# Patient Record
(demographics unavailable — no encounter records)

---

## 2025-01-10 NOTE — DISCUSSION/SUMMARY
[Reviewed Clinical Lab Test(s)] : Results of clinical tests were reviewed. [Reviewed Radiology Report(s)] : Radiology reports were reviewed. [Discuss Alternatives/Risks/Benefits w/Patient] : All alternatives, risks, and benefits were discussed with the patient/family and all questions were answered.  Patient expressed good understanding and appreciates the importance of follow up as recommended. [Visit Time ___ Minutes] : [unfilled] minutes [Face to Face Time___ Minutes] : with [unfilled] minutes in face to face consultation. [Reviewed Radiology Film/Image(s)] : Images from radiology studies were reviewed and examined. [FreeTextEntry1] : 66yo P3 followed for asxs simple right ovarian cyst. stable on recent sono. continue surveillance, no need for intervention. -more than 50% of visit spent face to face with patient counseling and coordinating care -I reviewed again benign vs malignant etiology of ovarian cysts. I explained that in her case cyst appears simple on imaging and has remained stable in size over serial imaging. I explained that a recent study reported that simple and stable ovarian cysts that are unchnaged on serial imaging in menopausal patients are most likely benign with very low percentage risk for malignancy. I explained that given her obesity and other comorbidities she is mod to high risk for surgical complications and without sxs and low suspicion for cancer here I do not recommend surgery. I recommended annual pelvic sono or as needed and pt can establish care with general GYN. Names of [facility] providers close to her home provided. all questions answered and patient expressed understanding -rtc PRN -pt to continue weight mgmt -pt to continue health maintenance with PCP -pain/fever/bleeding precautions given

## 2025-01-10 NOTE — HISTORY OF PRESENT ILLNESS
[FreeTextEntry1] :  66yo P3  being followed for incidental finding of right ovarian cyst. Pt here today for follow up and review of recent pelvic sono. Since last visit patient reports doing well. she  denies pain/fever/bleeding/bloating. She has normal activity tolerance and normal appetite. Reports normal urination and BMs. she is up to date with her PCP and with health maintenance. she is on weight loss journey and tried ozempic but caused too much nausea. she is hesitant to try monjaro as recommended by her PCP but has started weight watchers. We reviewed recent sono and cyst is stable. pt reassured. I also reviewed cervix and vulvar biopsies from last visit and all benign findings.   Labs: PAP: NILM (atrophic smear)/ HPV neg (24) Ca125: 3 (24)  24 PATHOLOGY: FINAL DIAGNOSIS A. CERVIX, 2:00, BIOPSY - Benign epidermolytic acanthoma, see comment B. LEFT LABIA/LEFT VULVA, BIOPSY - Benign squamous mucosa and stromal tissue focally lined by endocervical-type mucosa - Stains for p16 and Ki67 show normal patterns of expression in the mucosa, supporting this diagnosis - Deeper levels were examined DIAGNOSIS COMMENT Epidermolytic acanthomas are benign lesions that are not HPV-associated and are most commonly found on the skin of the genital areas.  IMAGIN25 Pelvic US: FINDINGS: Uterus: 6.3 cm x 3.2 cm x 3.8 cm. Within normal limits. Endometrium: 4 mm. Within normal limits. Right ovary: 5.6 cm x 4.6 cm x 4.7 cm simple right adnexal cyst. A separate right ovary is again not identified. Left ovary: Not seen. Fluid: None. IMPRESSION: Stable 5.6 cm simple right adnexal cyst.  24 MRI: Findings: Lower chest: Asymmetric elevation of the right hemidiaphragm Liver: Hepatomegaly and measures at least 20.5 cm diffuse hepatic steatosis.  Small area of fat sparing near the gallbladder fossa Bile ducts: Normal caliber Gallbladder: Dilated without wall thickening may be due to prolonged n.p.o. status Spleen: Within normal limits Pancreas: Few tiny scattered cyst under 3 mm.  No main duct dilation Adrenals: Left adrenal 4.1 x 3.6 x 3.2 cm enhancing masses primarily comprised of macroscopic fat with a small less than 25% macroscopic fat component Kidneys/ureters: Symmetric renal enhancement without hydronephrosis.  Right lower pole 1.1 cm simple cortical cyst corresponds with the CT finding.  Few additional subcentimeter bilateral simple cortical cysts Bladder: Minimally distended Reproductive organs: Atrophic uterus.  Atrophic endometrium measures 2 mm.  Atrophic left ovary. atrophic right ovary contains a unilocular simple appearing 5.4 x 4 x 4.3 cm cystic lesion without internal septations or nodularities Bowel: Unobstructed bowel.  Colonic diverticulosis.  Normal appendix. Peritoneum: No ascites Vessels: Arthrosclerotic changes. Retroperitoneum/lymph nodes: No lymphadenopathy.  Abdominal wall: Tiny fat-containing umbilical hernia.  Bones: Multilevel degenerative changes throughout the spine. Impression: 1.  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  Recommend correlation with prior CT or MRI if any remote examinations exist to determine stability of this mass.  Recommend urology referral for further management. 2.  Right ovarian 5.4 cm unilocular simple appearing cystic neoplasm unchanged when measuring in similar fashion.  Recommend follow-up pelvic ultrasound or MRI in 6 months in the absence of resection. 3.  Right lower renal pole finding corresponds with a small simple cortical cyst.  No suspicious renal abnormality. 4.  Hepatomegaly with diffuse hepatic steatosis.   24 pelvic sono: Findings: Uterus: 5.3 cm x 2.6 cm x 3.4 cm.  Within normal limits.  Endometrium: 5 mm.  Within normal limits. Right ovary: 5.0 cm right adnexal cyst.  Separate right ovary not identified.  Left ovary: Not seen.   Fluid: None Impression: 5.0 cm right adnexal simple cyst.  Separate right ovary not identified.  Yearly sonographic follow-up is advised.

## 2025-01-10 NOTE — DISCUSSION/SUMMARY
[Reviewed Clinical Lab Test(s)] : Results of clinical tests were reviewed. [Reviewed Radiology Report(s)] : Radiology reports were reviewed. [Discuss Alternatives/Risks/Benefits w/Patient] : All alternatives, risks, and benefits were discussed with the patient/family and all questions were answered.  Patient expressed good understanding and appreciates the importance of follow up as recommended. [Visit Time ___ Minutes] : [unfilled] minutes [Face to Face Time___ Minutes] : with [unfilled] minutes in face to face consultation. [Reviewed Radiology Film/Image(s)] : Images from radiology studies were reviewed and examined. [FreeTextEntry1] : 68yo P3 followed for asxs simple right ovarian cyst. stable on recent sono. continue surveillance, no need for intervention. -more than 50% of visit spent face to face with patient counseling and coordinating care -I reviewed again benign vs malignant etiology of ovarian cysts. I explained that in her case cyst appears simple on imaging and has remained stable in size over serial imaging. I explained that a recent study reported that simple and stable ovarian cysts that are unchnaged on serial imaging in menopausal patients are most likely benign with very low percentage risk for malignancy. I explained that given her obesity and other comorbidities she is mod to high risk for surgical complications and without sxs and low suspicion for cancer here I do not recommend surgery. I recommended annual pelvic sono or as needed and pt can establish care with general GYN. Names of [facility] providers close to her home provided. all questions answered and patient expressed understanding -rtc PRN -pt to continue weight mgmt -pt to continue health maintenance with PCP -pain/fever/bleeding precautions given